# Patient Record
Sex: FEMALE | Race: BLACK OR AFRICAN AMERICAN | NOT HISPANIC OR LATINO | URBAN - METROPOLITAN AREA
[De-identification: names, ages, dates, MRNs, and addresses within clinical notes are randomized per-mention and may not be internally consistent; named-entity substitution may affect disease eponyms.]

---

## 2023-03-01 ENCOUNTER — EMERGENCY (EMERGENCY)
Facility: HOSPITAL | Age: 23
LOS: 1 days | Discharge: LEFT BEFORE DISCHARGE | End: 2023-03-01
Attending: STUDENT IN AN ORGANIZED HEALTH CARE EDUCATION/TRAINING PROGRAM | Admitting: STUDENT IN AN ORGANIZED HEALTH CARE EDUCATION/TRAINING PROGRAM
Payer: SELF-PAY

## 2023-03-01 VITALS
RESPIRATION RATE: 16 BRPM | DIASTOLIC BLOOD PRESSURE: 85 MMHG | HEART RATE: 79 BPM | TEMPERATURE: 99 F | HEIGHT: 63 IN | WEIGHT: 145.06 LBS | SYSTOLIC BLOOD PRESSURE: 135 MMHG | OXYGEN SATURATION: 100 %

## 2023-03-01 VITALS
SYSTOLIC BLOOD PRESSURE: 116 MMHG | OXYGEN SATURATION: 100 % | DIASTOLIC BLOOD PRESSURE: 64 MMHG | RESPIRATION RATE: 18 BRPM | HEART RATE: 88 BPM
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
## 2023-03-01 DIAGNOSIS — L50.0 ALLERGIC URTICARIA: ICD-10-CM

## 2023-03-01 DIAGNOSIS — T38.5X5A ADVERSE EFFECT OF OTHER ESTROGENS AND PROGESTOGENS, INITIAL ENCOUNTER: ICD-10-CM

## 2023-03-01 DIAGNOSIS — T88.6XXA ANAPHYLACTIC REACTION DUE TO ADVERSE EFFECT OF CORRECT DRUG OR MEDICAMENT PROPERLY ADMINISTERED, INITIAL ENCOUNTER: ICD-10-CM

## 2023-03-01 DIAGNOSIS — Z53.29 PROCEDURE AND TREATMENT NOT CARRIED OUT BECAUSE OF PATIENT'S DECISION FOR OTHER REASONS: ICD-10-CM

## 2023-03-01 DIAGNOSIS — R11.2 NAUSEA WITH VOMITING, UNSPECIFIED: ICD-10-CM

## 2023-03-01 PROCEDURE — 99284 EMERGENCY DEPT VISIT MOD MDM: CPT

## 2023-03-01 PROCEDURE — 99285 EMERGENCY DEPT VISIT HI MDM: CPT

## 2023-03-01 RX ORDER — FAMOTIDINE 10 MG/ML
20 INJECTION INTRAVENOUS ONCE
Refills: 0 | Status: DISCONTINUED | OUTPATIENT
Start: 2023-03-01 | End: 2023-03-05

## 2023-03-01 NOTE — ED PROVIDER NOTE - CLINICAL SUMMARY MEDICAL DECISION MAKING FREE TEXT BOX
pt normal exam appears well  already got epipen, benadryl  will give steroids, pepcid. Pt does not want IV, can give PO and observe    --->RN went to give pt meds and her and her  were nowhere to be found, their belongings were gone as well. Attempted to find pt a few times, seems to have eloped.

## 2023-03-01 NOTE — ED PROVIDER NOTE - OBJECTIVE STATEMENT
22-year-old female with a past medical history comes in for evaluation.  She was at her GYN office today and got a Depo shot which was her first time getting the shot, about 10 minutes later she felt very nauseous and vomited and developed hives.  No respiratory distress no wheezing no sensation of throat closing.  They gave her an EpiPen and Benadryl and called EMS.  On my interview patient denies any symptoms says that she does not have any abdominal pain nausea vomiting, hives, itchiness, difficulty breathing, wheezing, throat tightness.  She seems unhappy to be here, says she does not want any more treatments as this is all very expensive and she thinks it was an overreaction for the office to call EMS.  We came to an agreement that we will give medications by mouth and observe.

## 2023-03-01 NOTE — ED ADULT NURSE NOTE - CHIEF COMPLAINT QUOTE
Pt BIBEMS from clinic for evaluation of allergic reaction. Pt had depo shot for first time PTA and c/o rash, dizziness, and  vomiting after.

## 2023-03-01 NOTE — ED ADULT NURSE NOTE - OBJECTIVE STATEMENT
22F BIBA for evaluation of allergic reaction s/p receiving benadryl and EPI pen. pt seen as an upgrade by Dr. Merchant and writer. upon evaluation pt refusing to be placed on continuous cardiac monitor, refusing peripheral IV. pt states "they already took me here for no reason im not doing anything that will cost money." Dr. Merchant agreeable for patient to receive PO medications rather than IV. pt airway intact, no angioedema noted. pt speaking in clear, complete sentences. RR easy, even, un-labored on room air. evaluation incomplete. soon after evaluation, pt noted to have eloped out of ED prior to receiving medication. Dr. Merchant aware.
